# Patient Record
(demographics unavailable — no encounter records)

---

## 2025-04-28 NOTE — HISTORY OF PRESENT ILLNESS
[de-identified] : Date of initial evaluation: 04/28/2025 Patient age: 16 year Body part causing symptoms: Right knee  Location of the pain: Anterior , posterior  Pain score today: 8 Duration of symptoms: 04/21/2025 History of injury: Fell in a hole while walking in her backyard, twisted the knee Activities that worsen the pain: Walking, sitting, standing, sleeping  Radicular symptoms: no Medications attempted for this problem: Tylenol  PT for this problem: none  Injections for this problem: none Previous surgery on this body part: none Prior imaging: Urgent care X-Ray, reportedly negative Occupation: Student

## 2025-04-28 NOTE — DISCUSSION/SUMMARY
[de-identified] : (Impression) -right knee medial meniscus and ACL tear versus patellofemoral instability event  The diagnosis was explained in detail. The potential non-surgical and surgical treatments were reviewed. The relative risks and benefits of each option were considered relative to the patient age, activity level, medical history, symptom severity and previously attempted treatments.  The patient was advised to consult with their primary medical provider prior to initiation of any new medications to reduce the risk of adverse effects specific to their long-term home medications and medical history. The risk of gastrointestinal irritation and kidney injury specific to long-term NSAID use was discussed.    (Plan)  -Meloxicam for pain and inflammation, take as needed. -MRI of the knee ordered to evaluate for meniscus and/or ligament tear. -Weight bearing as tolerated, wean crutches and brace.  -School note provided.  -Follow up when imaging completed.     (MDM) Problem Complexity -Moderate: acute, complicated injury   Risk -Moderate: prescription medication   Visit Type -New: Patient has not been seen by another provider in my practice within the past 2 years who specializes in orthopedic surgery.

## 2025-04-28 NOTE — IMAGING
[de-identified] : (Exam: Knee)   Laterality is right    Patient is in no acute distress, alert and oriented Sensation is grossly intact to light touch in the foot Motor function is 5/5 in the foot Capillary refill is less than 2 seconds in the toes Lymphadenopathy is not present Peripheral edema is not present   Skin is intact Effusion is trace Atrophy is not present Antalgic gait is present   Medial joint line tenderness is present Lateral joint line tenderness is not present Patellar tenderness is not present Calf tenderness is not present   Knee extension is 5 Knee flexion is 90   Quadriceps strength is 4/5 Hamstring strength is 4/5   Lachman is equivocal Posterior drawer is normal Varus stress stability is normal Valgus stress stability is normal   Medial Skinny test is abnormal Lateral Skinny test is normal Patellofemoral grind test is normal Patellar apprehension test is normal

## 2025-05-27 NOTE — IMAGING
[de-identified] : (Exam: Knee)   Laterality is right    Patient is in no acute distress, alert and oriented Sensation is grossly intact to light touch in the foot Motor function is 5/5 in the foot Capillary refill is less than 2 seconds in the toes Lymphadenopathy is not present Peripheral edema is not present   Skin is intact Effusion is trace Atrophy is not present Antalgic gait is present   Medial joint line tenderness is not present Lateral joint line tenderness is not present Patellar tenderness is present Calf tenderness is not present   Knee extension is 0 Knee flexion is 120   Quadriceps strength is 4/5 Hamstring strength is 4/5   Lachman is normal Posterior drawer is normal Varus stress stability is normal Valgus stress stability is normal   Medial Skinny test is normal Lateral Skinny test is normal Patellofemoral grind test is abnormal Patellar apprehension test is normal

## 2025-05-27 NOTE — HISTORY OF PRESENT ILLNESS
[de-identified] : 05/27/2025: f/u MRI results for right knee. weaning brace and crutches. reports pain and mobility is improved. using tylenol and ibuprofen PRN.   Date of initial evaluation: 04/28/2025 Patient age: 16 year Body part causing symptoms: Right knee  Location of the pain: Anterior , posterior  Pain score today: 8 Duration of symptoms: 04/21/2025 History of injury: Fell in a hole while walking in her backyard, twisted the knee Activities that worsen the pain: Walking, sitting, standing, sleeping  Radicular symptoms: no Medications attempted for this problem: Tylenol  PT for this problem: none  Injections for this problem: none Previous surgery on this body part: none Prior imaging: Urgent care X-Ray, reportedly negative Occupation: Student

## 2025-05-27 NOTE — DISCUSSION/SUMMARY
[de-identified] : (Impression) -right knee patellofemoral syndrome  The diagnosis was explained in detail. The potential non-surgical and surgical treatments were reviewed. The relative risks and benefits of each option were considered relative to the patient age, activity level, medical history, symptom severity and previously attempted treatments.  The patient was advised to consult with their primary medical provider prior to initiation of any new medications to reduce the risk of adverse effects specific to their long-term home medications and medical history. The risk of gastrointestinal irritation and kidney injury specific to long-term NSAID use was discussed.    (Plan)  -Ibuprofen for pain and inflammation, take as needed. -Recommend physical therapy for stretching and strengthening. -Return to sports as symptoms permit. -Discontinue brace and crutches.  -Follow up in 1 month for clinical check.    (MDM) Problem Complexity -Moderate: acute, complicated injury   Risk -Low: over the counter medication   Visit Type -Established

## 2025-05-27 NOTE — DATA REVIEWED
[MRI] : MRI [Right] : of the right [Knee] : knee [I independently reviewed and interpreted images and report] : I independently reviewed and interpreted images and report [[email or web address]] : intact menisci and ligaments